# Patient Record
Sex: FEMALE | Race: BLACK OR AFRICAN AMERICAN
[De-identification: names, ages, dates, MRNs, and addresses within clinical notes are randomized per-mention and may not be internally consistent; named-entity substitution may affect disease eponyms.]

---

## 2017-03-24 ENCOUNTER — HOSPITAL ENCOUNTER (EMERGENCY)
Dept: HOSPITAL 24 - ER | Age: 57
Discharge: HOME | End: 2017-03-24
Payer: SELF-PAY

## 2017-03-24 VITALS — BODY MASS INDEX: 18.3 KG/M2

## 2017-03-24 VITALS — SYSTOLIC BLOOD PRESSURE: 150 MMHG | DIASTOLIC BLOOD PRESSURE: 83 MMHG
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
## 2017-03-24 DIAGNOSIS — Z86.79: ICD-10-CM
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
## 2017-03-24 DIAGNOSIS — S80.02XA: Primary | ICD-10-CM

## 2017-03-24 LAB
ALBUMIN SERPL BCP-MCNC: 4.6 G/DL (ref 3.4–5)
ALP SERPL-CCNC: 133 UNITS/L (ref 46–116)
ALT SERPL W P-5'-P-CCNC: 235 UNITS/L (ref 12–78)
AST SERPL W P-5'-P-CCNC: 522 UNITS/L (ref 15–37)
BASOPHILS # BLD AUTO: 0 X10^3/UL (ref 0–0.1)
BASOPHILS NFR BLD AUTO: 0.8 % (ref 0.2–1)
BUN SERPL-MCNC: 8 MG/DL (ref 7–18)
CALCIUM ALBUM COR SERPL-SCNC: (no result) MG/DL (ref 8.5–10.1)
CALCIUM ALBUM COR SERPL-SCNC: (no result) MMOL/L (ref 136–145)
CALCIUM SERPL-MCNC: 9 MG/DL (ref 8.5–10.1)
CHLORIDE SERPL-SCNC: 91 MMOL/L (ref 98–107)
CO2 SERPL-SCNC: 25.2 MMOL/L (ref 21–32)
CREAT SERPL-MCNC: 0.68 MG/DL (ref 0.55–1.02)
EGFR  BLACK RACES: > 60 (ref 60–?)
EOSINOPHIL # BLD AUTO: 0 X10^3/UL (ref 0–0.2)
EOSINOPHIL NFR BLD AUTO: 0.4 % (ref 0.9–2.9)
ERYTHROCYTE [DISTWIDTH] IN BLOOD BY AUTOMATED COUNT: 12.9 % (ref 11.6–16.5)
GLUCOSE BLD-SCNC: 69 MG/DL (ref 65–99)
HCT VFR BLD AUTO: 37 % (ref 36–47)
HGB BLD-MCNC: 12.4 G/DL (ref 12–16)
LYMPHOCYTES # BLD AUTO: 1.6 X10^3/UL (ref 1.3–2.9)
LYMPHOCYTES NFR BLD AUTO: 34.3 % (ref 21–51)
MCH RBC QN AUTO: 30.6 PG (ref 27–34)
MCHC RBC AUTO-ENTMCNC: 33.5 G/DL (ref 33–35)
MCV RBC AUTO: 91.4 FL (ref 80–100)
MONOCYTES # BLD AUTO: 0.5 X10^3/UL (ref 0.3–0.8)
MONOCYTES NFR BLD AUTO: 11.5 % (ref 0–13)
NEUTROPHILS # BLD AUTO: 2.5 X10^3/UL (ref 2.2–4.8)
NEUTROPHILS NFR BLD AUTO: 53 % (ref 42–75)
PLATELET # BLD: 228 X10^3/UL (ref 150–450)
PMV BLD AUTO: 7.1 FL (ref 7.4–11)
PROT SERPL-MCNC: 9.1 G/DL (ref 6.4–8.2)
RBC # BLD AUTO: 4.05 X10^6/UL (ref 3.5–5.4)
SODIUM SERPL-SCNC: 132 MMOL/L (ref 136–145)
WBC NRBC COR # BLD AUTO: 4.7 X10^3/UL (ref 3.6–10)

## 2017-03-24 PROCEDURE — 93010 ELECTROCARDIOGRAM REPORT: CPT

## 2017-03-24 PROCEDURE — 99282 EMERGENCY DEPT VISIT SF MDM: CPT

## 2017-03-24 PROCEDURE — 93005 ELECTROCARDIOGRAM TRACING: CPT

## 2017-03-24 PROCEDURE — 36415 COLL VENOUS BLD VENIPUNCTURE: CPT

## 2017-03-24 PROCEDURE — 80053 COMPREHEN METABOLIC PANEL: CPT

## 2017-03-24 PROCEDURE — 85025 COMPLETE CBC W/AUTO DIFF WBC: CPT

## 2017-03-24 NOTE — DR.GENAD
HPI





- PCP


Primary Care Physician: NFD





- Complaint/Symptoms


Chief Complaint Doctors Comments: Patient states that this has been the third 

time she has blacked.  She has had a CT scan in the past.  She lives alone.


Chief Complaint:: FELL OUT LAST SUNDAY FOR ABOUT 2 1/2 HRS. FELL ON KNEES AND 

HAS ACE TO LEFT KNEE. YESTERDAY HAD RAPID HR AND RIGHT ARM SHAKING.





- Source


History Provided: Patient





- Mode of Arrival


Mode of Arrival: Ambulatory





- Timing


Onset of Chief Complaint: 03/19/17





PMH





- PMH


Past Medical History: Yes


Past Medical History: Anemia, Hypertension


Past Surgical History: Yes


Surgical History: Tonsillectomy





- Family History


History of Family Medical Conditions: Yes


Family Medical History: Diabetes Mellitus, Hypertension





- Social History


Type of Tobacco Use: None


Alcohol Use: Rarely


Do you use any recreational Drugs:: No


Lives With: Alone


Lives Where: Home





- infectious screening


In the last 2 months have you had wt loss of >10#?: NO


Have you had fever, night sweats or hemotysis?: No


Have you traveled outside the country in the last 6 months?: No


Isolation: Standard





ROS





- Review of Systems


Constitutional: No Symptoms Reported


Eyes: No Symptoms Reported


ENTM: No Symptoms Reported


Respiratoy: No Symptoms Reported


Cardiovascular: No Symptoms Reported


Gastrointestinal/Abdominal: No Symptoms Reported


Genitourinary: No Symptoms Reported


Neurological: No Symptoms Reported


Musculoskeletal: Left, Knee (pain)


Integumentary: No Symptoms Reported


Hematologic/Lymphatic: No Symptoms Reported


Endocrine: No Symptoms Reported


Psychiatric: No Symptoms Reported


All Other Systems: Reviewed and Negative





PE





- Vital Signs


Vitals: 


 





Temperature                      98.3 F


Pulse Rate                       87


Respiratory Rate                 14


Blood Pressure [Right Arm]       140/77


Blood Pressure [Left Arm]        155/85


Blood Pressure                   150/83


O2 Sat by Pulse Oximetry         100











- General


Limitations: No Limitations


General Appearance: Alert, In No Apparent Distress





- Head


Head Exam: Normal Inspection, Atraumatic





- Eyes


Eye exam: Normal Appearance, PERRL, EOMI





- ENT


ENT Exam: Normal Exam


External Ear Exam: Normal External Inspection


TM/Canal Exam: Bilateral Normal


Nose Exam: Normal Nose Exam


Mouth Exam: Normal Inspection


Throat Exam: Normal Inspection





- Neck


Neck Exam: Normal Inspection





- Respiratory


Respiratory Exam: Normal Lung Sounds Bilat


Respiratory Exam: Bilateral Clear to Auscultation





- Cardiovascular


Cardiovascular Exam: Regular Rate, Normal Rhythm





- Abdominal Exam


Abdominal Exam: Normal Inspection


Abdominal Tenderness: negative: RUQ, RLQ, LUQ, LLQ, Epigastrium, Suprapubic, 

Diffuse, Mild, Moderate, Severe, Other





- Extremities


Extremities Exam: Normal Inspection, Full ROM





- Back


Back Exam: Normal Inspection, Full ROM





- Neurologic


Neurological Exam: Alert, Oriented X3, CN II-XII Intact





- Psychiatric


Psychiatric Exam: Normal Affect





- Skin


Skin Exam: Warm, Dry, Intact





ROR





- Labs Reviewed


Result Diagrams: 


 03/24/17 15:15





 03/24/17 15:15


Laboratory: 


 











WBC  4.7 X10^3/uL (3.6-10.0)   03/24/17  15:15    


 


RBC  4.05 X10^6/uL (3.5-5.4)   03/24/17  15:15    


 


Hgb  12.4 g/dL (12.0-16.0)   03/24/17  15:15    


 


Hct  37.0 % (36.0-47.0)   03/24/17  15:15    


 


MCV  91.4 fL (80.0-100.0)   03/24/17  15:15    


 


MCH  30.6 pg (27.0-34.0)   03/24/17  15:15    


 


MCHC  33.5 g/dL (33.0-35.0)   03/24/17  15:15    


 


RDW  12.9 % (11.6-16.5)   03/24/17  15:15    


 


Plt Count  228 X10^3/uL (150.0-450.0)   03/24/17  15:15    


 


MPV  7.1 fL (7.4-11.0)  L  03/24/17  15:15    


 


Neut %  53.0 % (42.0-75.0)   03/24/17  15:15    


 


Lymph %  34.3 % (21.0-51.0)   03/24/17  15:15    


 


Mono %  11.5 % (0.0-13.0)   03/24/17  15:15    


 


Eos %  0.4 % (0.9-2.9)  L  03/24/17  15:15    


 


Baso %  0.8 % (0.2-1.0)   03/24/17  15:15    


 


Neut #  2.5 x10^3/uL (2.2-4.8)   03/24/17  15:15    


 


Lymph #  1.6 X10^3/uL (1.3-2.9)   03/24/17  15:15    


 


Mono #  0.5 x10^3/uL (0.3-0.8)   03/24/17  15:15    


 


Eos #  0.0 x10^3/uL (0.0-0.2)   03/24/17  15:15    


 


Baso #  0.0 X10^3/uL (0.0-0.1)   03/24/17  15:15    


 


Absolute Nucleated RBC  0.1 /100WBC  03/24/17  15:15    


 


Sodium  132 mmol/L (136-145)  L  03/24/17  15:15    


 


Corrected Sodium  TNP   03/24/17  15:15    


 


Potassium  4.1 mmol/L (3.5-5.1)   03/24/17  15:15    


 


Chloride  91 mmol/L ()  L  03/24/17  15:15    


 


Carbon Dioxide  25.2 mmol/L (21-32)   03/24/17  15:15    


 


BUN  8 mg/dL (7-18)   03/24/17  15:15    


 


Creatinine  0.68 mg/dL (0.55-1.02)   03/24/17  15:15    


 


Est GFR (MDRD) Af Amer  > 60  (>60)   03/24/17  15:15    


 


Est GFR (MDRD) Non-Af  > 60  (>60)   03/24/17  15:15    


 


Glucose  69 mg/dL (65-99)   03/24/17  15:15    


 


Calcium  9.0 mg/dL (8.5-10.1)   03/24/17  15:15    


 


Corrected Calcium  TNP   03/24/17  15:15    


 


Total Bilirubin  0.40 mg/dL (0.2-1.0)   03/24/17  15:15    


 


AST  522 Units/L (15-37)  H  03/24/17  15:15    


 


ALT  235 Units/L (12-78)  H  03/24/17  15:15    


 


Alkaline Phosphatase  133 Units/L ()  H  03/24/17  15:15    


 


Total Protein  9.1 g/dL (6.4-8.2)  H  03/24/17  15:15    


 


Albumin  4.6 g/dL (3.4-5.0)   03/24/17  15:15    


 


Globulin  4.5 g/dL (2.5-4.5)   03/24/17  15:15    


 


Albumin/Globulin Ratio  1.0 Ratio (1.1-2.1)  L  03/24/17  15:15    














- EKG


Rhythm: NSR


ST: Inf (old)





- Diagnosis


Discharge Problem: 


 History of syncope





Contusion of knee, left


Qualifiers:


 Encounter type: initial encounter Qualified Code(s): S80.02XA - Contusion of 

left knee, initial encounter








- Discharge Plan


Condition: Stable





- Follow ups/Referrals


Follow ups/Referrals: 


[facility],None [Primary Care Provider] - 3 days





- Instructions

## 2017-06-15 ENCOUNTER — HOSPITAL ENCOUNTER (EMERGENCY)
Dept: HOSPITAL 24 - ER | Age: 57
Discharge: HOME | End: 2017-06-15
Payer: SELF-PAY

## 2017-06-15 VITALS — DIASTOLIC BLOOD PRESSURE: 99 MMHG | SYSTOLIC BLOOD PRESSURE: 169 MMHG

## 2017-06-15 VITALS — BODY MASS INDEX: 16.9 KG/M2

## 2017-06-15 DIAGNOSIS — S01.81XA: Primary | ICD-10-CM
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
## 2017-06-15 PROCEDURE — 0WQ00ZZ REPAIR HEAD, OPEN APPROACH: ICD-10-PCS | Performed by: PEDIATRICS

## 2017-06-15 PROCEDURE — 90471 IMMUNIZATION ADMIN: CPT

## 2017-06-15 PROCEDURE — 99282 EMERGENCY DEPT VISIT SF MDM: CPT

## 2017-06-15 NOTE — DR.GENAD
HPI





- PCP


Primary Care Physician: nfd





- Complaint/Symptoms


Chief Complaint Doctors Comments: History as stated


Chief Complaint:: pt fell and hit her coffee table laceration  too forehead 

between her eyes  3 cm in length





- Source


History Provided: Patient





- Mode of Arrival


Mode of Arrival: EMS





- Timing


Onset of Chief Complaint: 06/15/17





PMH





- PMH


Past Medical History: Yes


Past Medical History: Anemia, Hypertension


Past Surgical History: Yes


Surgical History: Tonsillectomy





- Family History


History of Family Medical Conditions: Yes


Family Medical History: Diabetes Mellitus, Hypertension





- Social History


Does any household member use tobacco: No


Alcohol Use: Occasionally


Do you use any recreational Drugs:: No


Lives With: Family


Lives Where: Home





- infectious screening


In the last 2 months have you had wt loss of >10#?: NO


Have you had fever, night sweats or hemotysis?: No


Have you traveled outside the country in the last 6 months?: No


Isolation: Standard





ROS





- Review of Systems


Eyes: No Symptoms Reported


ENTM: No Symptoms Reported


Respiratoy: No Symptoms Reported


Cardiovascular: No Symptoms Reported


Gastrointestinal/Abdominal: No Symptoms Reported


Genitourinary: No Symptoms Reported


Neurological: No Symptoms Reported


Musculoskeletal: No Symptoms Reported


Integumentary: No Symptoms Reported


Hematologic/Lymphatic: No Symptoms Reported


Endocrine: No Symptoms Reported


Psychiatric: No Symptoms Reported


All Other Systems: Reviewed and Negative





PE





- Vital Signs


Vitals: 





 





Temperature                      98.3 F


Pulse Rate                       104


Respiratory Rate                 18


Blood Pressure [Right Arm]       140/77


Blood Pressure [Left Arm]        155/85


Blood Pressure                   169/99


O2 Sat by Pulse Oximetry         100











- General


Limitations: No Limitations


General Appearance: Alert, In No Apparent Distress





- Head


Head Exam: Normal Inspection, Atraumatic





- Eyes


Eye exam: Normal Appearance, PERRL, EOMI





- ENT


ENT Exam: Normal Exam


External Ear Exam: Normal External Inspection


TM/Canal Exam: Bilateral Normal


Nose Exam: Normal Nose Exam, Nasal Deviation


Mouth Exam: Normal Inspection


Throat Exam: Normal Inspection





- Neck


Neck Exam: Normal Inspection





- Chest


Chest Inspection: Normal Inspection





- Respiratory


Respiratory Exam: Normal Lung Sounds Bilat


Respiratory Exam: Bilateral Clear to Auscultation





- Cardiovascular


Cardiovascular Exam: Regular Rate, Normal Rhythm





- Abdominal Exam


Abdominal Exam: Normal Inspection


Abdominal Tenderness: negative: RUQ, RLQ, LUQ, LLQ, Epigastrium, Suprapubic, 

Diffuse, Mild, Moderate, Severe, Other





- Extremities


Extremities Exam: Normal Inspection





- Back


Back Exam: Normal Inspection





- Neurologic


Neurological Exam: Alert, Oriented X3, CN II-XII Intact





- Psychiatric


Psychiatric Exam: Normal Affect





- Skin


Skin Exam: Warm, Dry, Intact





Procedures





- Laceration/Wound Repair


  ** Right Frontal


Wound Length (cm): 3


Wound's Depth, Shape: Superficial, Linear


Wound Explored: clean


Anesthesia: 1% Lidocaine w/ Epi


Volume Anesthetic (ccs): 10


Wound Repaired With: sutures


Suture Size/Type: 5:0, Prolene


Number of Sutures: 10


Layer Closure?: No





- Diagnosis


Discharge Problem: 


Facial laceration


Qualifiers:


 Encounter type: initial encounter Qualified Code(s): S01.81XA - Laceration 

without foreign body of other part of head, initial encounter








- Discharge Plan


Condition: Stable





- Follow ups/Referrals


Follow ups/Referrals: 


[facility],None [Primary Care Provider] - 3 days





- Instructions

## 2018-02-22 ENCOUNTER — HOSPITAL ENCOUNTER (EMERGENCY)
Dept: HOSPITAL 24 - ER | Age: 58
Discharge: HOME | End: 2018-02-22
Payer: SELF-PAY

## 2018-02-22 VITALS — DIASTOLIC BLOOD PRESSURE: 97 MMHG | SYSTOLIC BLOOD PRESSURE: 140 MMHG

## 2018-02-22 VITALS — BODY MASS INDEX: 17.5 KG/M2
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
## 2018-02-22 DIAGNOSIS — M85.80: ICD-10-CM

## 2018-02-22 DIAGNOSIS — S90.121A: Primary | ICD-10-CM

## 2018-02-22 PROCEDURE — 73630 X-RAY EXAM OF FOOT: CPT

## 2018-02-22 PROCEDURE — 99282 EMERGENCY DEPT VISIT SF MDM: CPT

## 2018-02-22 NOTE — DR.GENAD
HPI





- PCP


Primary Care Physician: BORIS





- HPI Comment


HPI Comment: HISTORY AS BELOW.





- Complaint/Symptoms


Chief Complaint Doctors Comments: INJURY RIGHT FOOT TONIGHT. HIT FOOT ON SARGENT 

STAND BEFORE COMING TO ED. CRUSH INJURY.


Chief Complaint:: PT C/O GOING TO GET HER SOME CHIPS AND HITTING HER RIGHT FOOT 

ON THE NIGHT STAND AND SHE IS C/O OF PAIN.





- Nurses notes reviewed


Nurses Notes Review: Yes





- Source


History Provided: Patient





- Mode of Arrival


Mode of Arrival: Ambulatory





- Timing


Onset of Chief Complaint: 02/22/18


Came on: Suddenly





- Duration


Duration: Constant


Duration: Hours





- Severity


Severity: Moderate





PMH





- PMH


Past Medical History: No


Past Medical History: Anemia, Hypertension


Past Surgical History: No


Surgical History: Tonsillectomy





- Family History


History of Family Medical Conditions: Yes


Family Medical History: Diabetes Mellitus, Hypertension





- Social History


Does patient currently use any type of tobacco product: No


Have you used tobacco products in the last 12 months: No


Type of Tobacco Use: None


Does any household member use tobacco: No


Alcohol Use: Rarely


Do you use any recreational Drugs:: No


Lives With: Alone


Lives Where: Home





- infectious screening


In the last 2 months have you had wt loss of >10#?: NO


Have you had fever, night sweats or hemotysis?: No


Have you traveled outside the country in the last 6 months?: No


Isolation: Standard





ROS





- Review of Systems


Constitutional: No Symptoms Reported


Eyes: No Symptoms Reported


ENTM: No Symptoms Reported


Respiratoy: No Symptoms Reported


Cardiovascular: No Symptoms Reported


Gastrointestinal/Abdominal: No Symptoms Reported


Genitourinary: No Symptoms Reported


Neurological: No Symptoms Reported


Musculoskeletal: Right, Foot (RT 5TH TOE SWOLLEN AND TENDER.)


Integumentary: No Symptoms Reported


Hematologic/Lymphatic: No Symptoms Reported


Endocrine: No Symptoms Reported


All Other Systems: Reviewed and Negative





PE





- Vital Signs


Vitals: 


 





Temperature                      97.2 F


Pulse Rate                       91


Respiratory Rate                 18


Blood Pressure [Right Arm]       140/77


Blood Pressure [Left Arm]        155/85


Blood Pressure                   140/97


O2 Sat by Pulse Oximetry         100











- General


Limitations: No Limitations


General Appearance: Alert





- Head


Head Exam: Normal Inspection





- Eyes


Eye exam: Normal Appearance





- ENT


ENT Exam: Normal  External Ear Exam


External Ear Exam: Normal External Inspection


Mouth Exam: Normal Inspection


Throat Exam: Normal Inspection





- Neck


Neck Exam: Trachea Midline





- Chest


Chest Inspection: Symmetric Chest Wall Rise





- Respiratory


Respiratory Exam: Normal Lung Sounds Bilat


Respiratory Exam: Bilateral Clear to Auscultation





- Abdominal Exam


Abdominal Exam: Normal Inspection





- Extremities


Extremities Exam: Tenderness (TENDERNESS RIGHT 5th TOE. SWOLLEN.)





- Back


Back Exam: Normal Inspection





- Neurologic


Neurological Exam: Alert, Oriented X3





- Psychiatric


Psychiatric Exam: Normal Affect, Normal Mood





- Skin


Skin Exam: Normal Color





MDM





- Differential Diagnosis


Differential Diagnosis: CONTUSION, SPRAIN, FRACTURE RT 5TH TOE.





Course





- Treatment


Treatment: SEE ORDERS.





- Education/Counseling


Education/Counseling: Patient, Education


Educated On: Diagnosis, Needs for Follow Up





ROR





- XRAY


XRAY Interpreted by: Radiologist


XRAY Findings: REPORT DISCUSS WITH PATIENT.





- Diagnosis


Discharge Problem: 


Contusion of toe of right foot


Qualifiers:


 Encounter type: initial encounter Toe: lesser toe Damage to nail status: 

without damage Qualified Code(s): S90.121A - Contusion of right lesser toe(s) 

without damage to nail, initial encounter








- Discharge Plan


Condition: Stable


Prescriptions: 


Ketorolac Tromethamine [Toradol Tab] 10 mg PO Q8H PRN #15 tab


 PRN Reason: Pain





- Follow ups/Referrals


Follow ups/Referrals: 


[facility],None [Primary Care Provider] - 3 days





- Instructions


Instructions:  Crush Injury, Fingers or Toes, Easy-to-Read


Additional Instructions: 


RETURN TO ED IF WORSE.

## 2018-02-22 NOTE — RAD
Right foot, three views



Indication:  Foot pain, trauma



Comparison:  None



Findings:  The bones are subjectively osteopenic diffusely.  No acute cortical disruption or malalign
ment identified.  No significant soft tissue abnormality.



Impression:  No acute skeletal injury of the right foot.  Osteopenia.



Reported By:Electronically Signed by TOM COLIN MD at 2/22/2018 6:53:34 PM

## 2018-03-28 ENCOUNTER — HOSPITAL ENCOUNTER (EMERGENCY)
Dept: HOSPITAL 24 - ER | Age: 58
Discharge: HOME | End: 2018-03-28
Payer: SELF-PAY

## 2018-03-28 VITALS — BODY MASS INDEX: 17.4 KG/M2

## 2018-03-28 VITALS — DIASTOLIC BLOOD PRESSURE: 76 MMHG | SYSTOLIC BLOOD PRESSURE: 146 MMHG

## 2018-03-28 DIAGNOSIS — S61.431A: Primary | ICD-10-CM
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
## 2018-03-28 PROCEDURE — 99282 EMERGENCY DEPT VISIT SF MDM: CPT

## 2018-03-28 PROCEDURE — 90471 IMMUNIZATION ADMIN: CPT

## 2018-03-28 NOTE — DR.BITE
HPI





- Time Seen


Time seen: 13:05





- PCP


Primary Care Physician: BORIS





- HPI Comment


HPI Comment: DOG BITE SUSTAIN TODAY. DOG WITH ANIMAL CONTROL.





- Complaint/Symptoms


Chief Complaint Doctor Comments: DOG BITE.


Chief Complaint:: PT. STATES SHE GOT BIT BY HER NEIGHBOR'S AME DOG. 

UNKNOWN STATUS OF IMMUNIZATIONS OF DOG. PT. HAS AN OPEN AREA BETWEEN RIGHT 

THUMB AND POINTER FINGER.





- Nurses notes reviewed


Nurses Notes Review: Yes





- Source


History Provided: Patient





- Mode of Arrival


Mode of Arrival: Ambulatory





- Duration


Duration: Constant


Duration: Hours





- Location


Location: Right, Hand





- Timing


Onset of Chief Complaint: 03/28/18


______ ago: Hours





- Context


Caused by: Dog


Symptoms: Pain, Puncture wound


Tetanus Immunization Current: No





- Severity


Pain: Moderate


Puritis Severity: None


SOB Severity: None





- Associated signs and symptoms


Associated signs and symptoms: None





PMH





- PMH


Past Medical History: Yes


Past Medical History: Anemia, Hypertension


Past Surgical History: Yes


Surgical History: Tonsillectomy





- Family History


History of Family Medical Conditions: Yes


Family Medical History: Diabetes Mellitus, Hypertension





- Social History


Does patient currently use any type of tobacco product: No


Have you used tobacco products in the last 12 months: No


Type of Tobacco Use: None


Does any household member use tobacco: No


Alcohol Use: Heavy


Do you use any recreational Drugs:: No


Lives With: Alone


Lives Where: Home





- infectious screening


In the last 2 months have you had wt loss of >10#?: NO


Have you had fever, night sweats or hemotysis?: No


Have you traveled outside the country in the last 6 months?: No





ROS





- Review of Systems


Constitutional: No Symptoms Reported


Eyes: No Symptoms Reported


ENTM: No Symptoms Reported


Respiratoy: Non-Productive Cough, Short of Breath.  negative: Wheezing, 

Hemoptysis


Cardiovascular: No Symptoms Reported


Gastrointestinal/Abdominal: No Symptoms Reported


Genitourinary: No Symptoms Reported


Neurological: No Symptoms Reported


Musculoskeletal: Right, Hand (PUNCTURE WOUND FIRST WEB SPACE.)


Integumentary: Other (PUNTURE WOUND FIRST WEB SPACE.)


Hematologic/Lymphatic: Easy Bleeding, Easy Bruising


Endocrine: No Symptoms Reported


All Other Systems: Reviewed and Negative





PE





- Vital Signs


Vital Signs: 


 











  Temp Pulse Resp BP BP BP Pulse Ox


 


 03/28/18 12:01  97 F L  89  17  146/76    96


 


 02/22/18 17:37     140/97   


 


 05/23/16 08:00      155/85  


 


 05/22/16 12:00       140/77 














- Constitutional


Limitations: No Limitations


General Appearance: Alert





- Head


Head Exam: Normal Inspection





- Eyes


Eye exam: Normal Appearance





- ENT


ENT Exam: Normal  External Ear Exam





- Neck


Neck Exam: Trachea Midline





- Chest


Chest Inspection: Symmetric Chest Wall Rise





- Respiratory


Respiratory Exam: Normal Lung Sounds Bilat


Respiratory Exam: Bilateral Rhonchi, Left Rhonchi, Right Rhonchi, Lower Rhonchi





- Cardiovascular


Cardiovascular Exam: Regular Rate, Normal Rhythm, Normal Heart Sounds





- Abdominal Exam


Abdominal Exam: Normal Inspection





- Extremities


Extremities Exam: Tenderness (PUNTURE WOUND RT FIRST WEB SPACE.)





- Back


Back Exam: Normal Inspection





- Neurologic


Neurological Exam: Alert, Oriented X3.  negative: CN II-XII Intact





- Skin


Skin Exam: Erythema


Description: Tenderness, Erythematous


Involving: Immediate area of bite


Through to: Skin, Bite





MDM





- Differential Diagnosis


Differential Diagnosis: Contusion (PUNTURE WOUND.)





Course





- Treatment


Treatment: SEE ORDERS.





- Education/Counseling


Education/Counseling: Patient, Education


Educated On: Diagnosis, Needs for Follow Up





- Diagnosis


Discharge Problem: 


 Puncture wound





Dog bite


Qualifiers:


 Encounter type: initial encounter Qualified Code(s): W54.0XXA - Bitten by dog, 

initial encounter








- Discharge Plan


Disposition: 01 HOME, SELF-CARE


Condition: Stable


Prescriptions: 


Amoxicillin & Pot Clavulanate [AUGMENTIN  mg/125 mg *] 1 tab PO BID #20 

tab


Ibuprofen [MOTRIN  MG *] 600 mg PO TID PRN #30 tab


 PRN Reason: Pain/Inflammation





- Follow ups/Referrals


Follow ups/Referrals: 


[facility],None [Primary Care Provider] - 2 days





- Instructions


Instructions:  Animal Bite, Easy-to-Read, Puncture Wound, Easy-to-Read, Wound 

Infection, Easy-to-Read


Additional Instructions: 


RETURN TO ED I8F WORSE.

## 2018-04-12 ENCOUNTER — HOSPITAL ENCOUNTER (EMERGENCY)
Dept: HOSPITAL 24 - ER | Age: 58
LOS: 1 days | Discharge: HOME | End: 2018-04-13
Payer: SELF-PAY

## 2018-04-12 VITALS — SYSTOLIC BLOOD PRESSURE: 160 MMHG | DIASTOLIC BLOOD PRESSURE: 76 MMHG

## 2018-04-12 VITALS — BODY MASS INDEX: 18.5 KG/M2

## 2018-04-12 DIAGNOSIS — M54.5: ICD-10-CM

## 2018-04-12 DIAGNOSIS — N30.01: Primary | ICD-10-CM

## 2018-04-12 LAB
BILIRUB UR QL STRIP.AUTO: NEGATIVE
GLUCOSE UR QL STRIP.AUTO: NEGATIVE
KETONES UR QL STRIP.AUTO: NEGATIVE
LEUKOCYTE ESTERASE UR QL STRIP.AUTO: (no result)
NITRITE UR QL STRIP.AUTO: NEGATIVE
PH UR STRIP.AUTO: 5 [PH] (ref 5–8)
PROT UR QL STRIP.AUTO: (no result)
RBC # UR STRIP.AUTO: (no result) /UL
SP GR UR STRIP.AUTO: 1 (ref 1–1.03)
UROBILINOGEN UR QL STRIP.AUTO: NORMAL

## 2018-04-12 PROCEDURE — 81001 URINALYSIS AUTO W/SCOPE: CPT

## 2018-04-12 PROCEDURE — 99282 EMERGENCY DEPT VISIT SF MDM: CPT

## 2018-04-12 PROCEDURE — 99281 EMR DPT VST MAYX REQ PHY/QHP: CPT

## 2018-04-12 NOTE — DR.GENAD
HPI





- PCP


Primary Care Physician: REYNA





- Complaint/Symptoms


Chief Complaint:: BACK PAIN


Self Treatment fo Chief Complaint: TYLENOL ABOUT A WEEK AGO AND IT DIDNT HELP





- Nurses notes reviewed


Nurses Notes Review: Yes





- Source


History Provided: Patient





- Mode of Arrival


Mode of Arrival: Ambulatory





- Timing


Onset of Chief Complaint: 18


Came on: Gradually





- Duration


Duration: Intermittent


How lon


Duration: Weeks





- Location


Location: lower back





- Severity


Severity: Mild





- Modifying Factors


Worsens:: weed eating





- Associated Signs and Symptoms


Associated Signs and Symptoms: none





PMH





- PMH


Past Medical History: Yes


Past Medical History: Hypertension


Past Surgical History: Yes


Surgical History: Tonsillectomy





- Family History


History of Family Medical Conditions: No


Family Medical History: Diabetes Mellitus, Hypertension





- Social History


Does patient currently use any type of tobacco product: No


Have you used tobacco products in the last 12 months: No


Type of Tobacco Use: None


Does any household member use tobacco: No


Alcohol Use: None


Do you use any recreational Drugs:: No


Lives With: Alone


Lives Where: Home





- infectious screening


In the last 2 months have you had wt loss of >10#?: NO


Have you had fever, night sweats or hemotysis?: No


Have you traveled outside the country in the last 6 months?: No


Isolation: Standard





ROS





- Review of Systems


Constitutional: No Symptoms Reported


Eyes: No Symptoms Reported


ENTM: No Symptoms Reported


Respiratoy: No Symptoms Reported


Cardiovascular: No Symptoms Reported


Gastrointestinal/Abdominal: No Symptoms Reported


Genitourinary: No Symptoms Reported


Neurological: No Symptoms Reported


Musculoskeletal: Back


Integumentary: No Symptoms Reported


Hematologic/Lymphatic: No Symptoms Reported


Endocrine: No Symptoms Reported


Psychiatric: No Symptoms Reported


All Other Systems: Reviewed and Negative





PE





- Vital Signs


Vitals: 


 





Temperature                      98.2 F


Pulse Rate                       81


Respiratory Rate                 18


Blood Pressure [Right Arm]       140/77


Blood Pressure [Left Arm]        155/85


Blood Pressure                   160/76


O2 Sat by Pulse Oximetry         98











- General


Limitations: No Limitations


General Appearance: Alert, In No Apparent Distress





- Head


Head Exam: Normal Inspection





- Eyes


Eye exam: Normal Appearance, EOMI.  negative: Scleral Icterus, Conjunctival 

Injection





- ENT


ENT Exam: Normal Exam, Normal Oropharynx





- Neck


Neck Exam: Normal Inspection, Full ROM, Trachea Midline





- Chest


Chest Inspection: Normal Inspection





- Respiratory


Respiratory Exam: negative: Accessory Muscle Use, Respiratory Distress





- Extremities


Extremities Exam: Normal Inspection, Full ROM.  negative: Tenderness, Edema





- Back


Back Exam: Normal Inspection, Full ROM





- Neurologic


Neurological Exam: Alert, Oriented X3, CN II-XII Intact





- Psychiatric


Psychiatric Exam: Normal Affect, Normal Mood





- Skin


Skin Exam: Intact, Normal Color





ROR





- Labs Reviewed


Laboratory: 


 











Specimen Type  Clean catch urine   18  23:40    


 


Urine Color  Pale yellow  (YELLOW)   18  23:40    


 


Urine Appearance  Clear  (CLEAR)   18  23:40    


 


Urine pH  5.0  (5.0 - 8.0)   04  23:40    


 


Ur Specific Gravity  1.005  (1.000-1.030)   18  23:40    


 


Urine Protein  1+  (NEGATIVE)   18  23:40    


 


Urine Glucose (UA)  Negative  (NEGATIVE)   18  23:40    


 


Urine Ketones  Negative  (NEGATIVE)   18  23:40    


 


Urine Occult Blood  1+  (NEGATIVE)   18  23:40    


 


Urine Nitrite  Negative  (NEGATIVE)   18  23:40    


 


Urine Bilirubin  Negative  (NEGATIVE)   18  23:40    


 


Urine Urobilinogen  Normal  (NORMAL)   18  23:40    


 


Ur Leukocyte Esterase  3+  (NEGATIVE)   18  23:40    


 


Urine RBC  3-5 /HPF (NONE SEEN)   18  23:40    


 


Urine WBC  10-20 /HPF (NONE SEEN)   04  23:40    


 


Ur Squamous Epith Cells  Few /HPF (NEGATIVE)   18  23:40    


 


Urine Bacteria  Negative /HPF (NEGATIVE)   18  23:40    


 


Ur Culture Indicated?  No/not indicated   18  23:40    














- Diagnosis


Discharge Problem: 


 Back pain, lumbosacral





UTI (urinary tract infection)


Qualifiers:


 Urinary tract infection type: acute cystitis Hematuria presence: with 

hematuria Qualified Code(s): N30.01 - Acute cystitis with hematuria








- Discharge Plan


Condition: Stable


Prescriptions: 


Cyclobenzaprine HCl [Flexeril] 5 mg PO HS #14 tab


Nitrofurantoin Macro [Macrobid Cap 100 mg Ext Rel] 100 mg PO BID #14 cap





- Follow ups/Referrals


Follow ups/Referrals: 


[facility],None [Primary Care Provider] - 3 days





- Instructions

## 2018-04-13 LAB
BACTERIA URNS QL MICRO: NEGATIVE /HPF
CLARITY UR: CLEAR
COLOR UR AUTO: (no result)
RBC #/AREA URNS HPF: (no result) /HPF
SQUAMOUS URNS QL MICRO: (no result) /HPF

## 2018-05-07 ENCOUNTER — HOSPITAL ENCOUNTER (EMERGENCY)
Dept: HOSPITAL 24 - ER | Age: 58
LOS: 1 days | Discharge: HOME | End: 2018-05-08
Payer: SELF-PAY

## 2018-05-07 VITALS — BODY MASS INDEX: 18.3 KG/M2

## 2018-05-07 DIAGNOSIS — M47.22: Primary | ICD-10-CM

## 2018-05-07 PROCEDURE — 85025 COMPLETE CBC W/AUTO DIFF WBC: CPT

## 2018-05-07 PROCEDURE — 80053 COMPREHEN METABOLIC PANEL: CPT

## 2018-05-07 PROCEDURE — 36415 COLL VENOUS BLD VENIPUNCTURE: CPT

## 2018-05-07 PROCEDURE — 99282 EMERGENCY DEPT VISIT SF MDM: CPT

## 2018-05-07 PROCEDURE — 72040 X-RAY EXAM NECK SPINE 2-3 VW: CPT

## 2018-05-07 NOTE — DR.GENAD
HPI





- PCP


Primary Care Physician: [facility]





- Complaint/Symptoms


Chief Complaint Doctors Comments: Patient admits to neck pain associated with 

left upper extremity numbness


Chief Complaint:: left arm numbness





- Source


History Provided: Patient





- Mode of Arrival


Mode of Arrival: Ambulatory





- Timing


Onset of Chief Complaint: 05/03/18





PMH





- PMH


Past Medical History: Yes


Past Medical History: Hypertension


Past Surgical History: Yes


Surgical History: Tonsillectomy





- Family History


History of Family Medical Conditions: Yes


Family Medical History: Diabetes Mellitus, Hypertension





- Social History


Does patient currently use any type of tobacco product: No


Have you used tobacco products in the last 12 months: No


Type of Tobacco Use: None


Does any household member use tobacco: No


Alcohol Use: Occasionally


Do you use any recreational Drugs:: No


Lives With: Alone


Lives Where: Home





- infectious screening


In the last 2 months have you had wt loss of >10#?: NO


Have you had fever, night sweats or hemotysis?: No


Have you traveled outside the country in the last 6 months?: No


Isolation: Standard





ROS





- Review of Systems


Eyes: No Symptoms Reported


ENTM: No Symptoms Reported


Respiratoy: No Symptoms Reported


Cardiovascular: No Symptoms Reported


Gastrointestinal/Abdominal: No Symptoms Reported


Genitourinary: No Symptoms Reported


Neurological: No Symptoms Reported


Musculoskeletal: No Symptoms Reported


Integumentary: No Symptoms Reported


Hematologic/Lymphatic: No Symptoms Reported


Endocrine: No Symptoms Reported


Psychiatric: No Symptoms Reported


All Other Systems: Reviewed and Negative





PE





- Vital Signs


Vitals: 


 





Temperature                      99 F


Pulse Rate                       95


Respiratory Rate                 16


Blood Pressure [Right Arm]       140/77


Blood Pressure [Left Arm]        155/85


Blood Pressure                   177/90


O2 Sat by Pulse Oximetry         99











- General


General Appearance: Alert, In No Apparent Distress





- Head


Head Exam: Normal Inspection, Atraumatic





- Eyes


Eye exam: Normal Appearance, PERRL, EOMI





- ENT


ENT Exam: Normal Exam


External Ear Exam: Normal External Inspection


TM/Canal Exam: Bilateral Normal


Nose Exam: Normal Nose Exam


Mouth Exam: Normal Inspection


Throat Exam: Normal Inspection





- Neck


Neck Exam: Normal Inspection





- Chest


Chest Inspection: Normal Inspection, Symmetric Chest Wall Rise





- Respiratory


Respiratory Exam: Normal Lung Sounds Bilat


Respiratory Exam: Bilateral Clear to Auscultation





- Cardiovascular


Cardiovascular Exam: Regular Rate, Normal Rhythm





- Abdominal Exam


Abdominal Exam: Normal Inspection, Normal Bowel Sounds


Abdominal Tenderness: negative: RUQ, RLQ, LUQ, LLQ, Epigastrium, Suprapubic, 

Diffuse, Mild, Moderate, Severe, Other





- Extremities


Extremities Exam: Normal Inspection





- Back


Back Exam: Normal Inspection





- Neurologic


Neurological Exam: Alert, Oriented X3, CN II-XII Intact





- Psychiatric


Psychiatric Exam: Normal Affect, Normal Mood





- Skin


Skin Exam: Warm, Dry, Intact





Course





- Education/Counseling


Educated On: Treatment, Diagnosis, Prognosis, Needs for Follow Up





ROR





- Labs Reviewed


Result Diagrams: 


 05/08/18 01:15





 05/08/18 01:15





- XRAY


XRAY Interpreted by: Radiologist (Cervical Spine: There is cervical spine dis 

degenerative change at C4 through C6 with mild disc space narrowing.  

Prevertebral soft tisssues are normal.  No cortical lucency or malalignment 

seen.  Craniocervical junction and cervicothoracic junctions appear grossly 

intact.  Impression: Degenerative change without acute fracture)





- Diagnosis


Discharge Problem: 


Osteoarthritis of cervical spine


Qualifiers:


 Spinal osteoarthritis complication: with radiculopathy Qualified Code(s): 

M47.22 - Other spondylosis with radiculopathy, cervical region








- Discharge Plan


Condition: Stable





- Follow ups/Referrals


Follow ups/Referrals: 


[facility],None [Primary Care Provider] - 3 days





- Instructions

## 2018-05-08 VITALS — SYSTOLIC BLOOD PRESSURE: 153 MMHG | DIASTOLIC BLOOD PRESSURE: 79 MMHG

## 2018-05-08 LAB
ALBUMIN SERPL BCP-MCNC: 4.3 G/DL (ref 3.4–5)
ALP SERPL-CCNC: 126 UNITS/L (ref 46–116)
ALT SERPL W P-5'-P-CCNC: 51 UNITS/L (ref 12–78)
AST SERPL W P-5'-P-CCNC: 114 UNITS/L (ref 15–37)
BASOPHILS # BLD AUTO: 0.1 X10^3/UL (ref 0–0.1)
BASOPHILS NFR BLD AUTO: 0.7 % (ref 0.2–1)
BUN SERPL-MCNC: 5 MG/DL (ref 7–18)
CALCIUM ALBUM COR SERPL-SCNC: (no result) MG/DL (ref 8.5–10.1)
CALCIUM ALBUM COR SERPL-SCNC: (no result) MMOL/L (ref 136–145)
CALCIUM SERPL-MCNC: 9.2 MG/DL (ref 8.5–10.1)
CHLORIDE SERPL-SCNC: 92 MMOL/L (ref 98–107)
CO2 SERPL-SCNC: 27.7 MMOL/L (ref 21–32)
CREAT SERPL-MCNC: 0.71 MG/DL (ref 0.55–1.02)
EGFR  BLACK RACES: > 60 (ref 60–?)
EOSINOPHIL # BLD AUTO: 0 X10^3/UL (ref 0–0.2)
EOSINOPHIL NFR BLD AUTO: 0.3 % (ref 0.9–2.9)
ERYTHROCYTE [DISTWIDTH] IN BLOOD BY AUTOMATED COUNT: 12.8 % (ref 11.6–16.5)
HCT VFR BLD AUTO: 33.7 % (ref 36–47)
HGB BLD-MCNC: 11.9 G/DL (ref 12–16)
LYMPHOCYTES # BLD AUTO: 1.4 X10^3/UL (ref 1.3–2.9)
LYMPHOCYTES NFR BLD AUTO: 19.2 % (ref 21–51)
MCH RBC QN AUTO: 32.6 PG (ref 27–34)
MCHC RBC AUTO-ENTMCNC: 35.4 G/DL (ref 33–35)
MCV RBC AUTO: 92.2 FL (ref 80–100)
MONOCYTES # BLD AUTO: 1.2 X10^3/UL (ref 0.3–0.8)
MONOCYTES NFR BLD AUTO: 16.4 % (ref 0–13)
NEUTROPHILS # BLD AUTO: 4.6 X10^3/UL (ref 2.2–4.8)
NEUTROPHILS NFR BLD AUTO: 63.4 % (ref 42–75)
PLATELET # BLD: 270 X10^3/UL (ref 150–450)
PMV BLD AUTO: 7.4 FL (ref 7.4–11)
PROT SERPL-MCNC: 9.4 G/DL (ref 6.4–8.2)
RBC # BLD AUTO: 3.66 X10^6/UL (ref 3.5–5.4)
SODIUM SERPL-SCNC: 131 MMOL/L (ref 136–145)
WBC NRBC COR # BLD AUTO: 7.3 X10^3/UL (ref 3.6–10)

## 2018-05-08 NOTE — RAD
Cervical spine-three views



Indication: Left arm and finger numbness for 4 days



Findings: There is cervical spine disc degenerative change at C4 through C6 with mild disc space narr
owing. Prevertebral soft tissues are normal. No cortical lucency or malalignment seen. Craniocervical
 junction and cervicothoracic junctions appear grossly intact.



Impression: Degenerative change without acute fracture.



Reported By:Electronically Signed by WES ECHEVARRIA MD at 5/8/2018 1:16:56 AM